# Patient Record
Sex: FEMALE | URBAN - METROPOLITAN AREA
[De-identification: names, ages, dates, MRNs, and addresses within clinical notes are randomized per-mention and may not be internally consistent; named-entity substitution may affect disease eponyms.]

---

## 2022-12-14 ENCOUNTER — APPOINTMENT (OUTPATIENT)
Dept: URBAN - METROPOLITAN AREA CLINIC 278 | Age: 57
Setting detail: DERMATOLOGY
End: 2022-12-14

## 2022-12-14 DIAGNOSIS — D485 NEOPLASM OF UNCERTAIN BEHAVIOR OF SKIN: ICD-10-CM

## 2022-12-14 DIAGNOSIS — D22 MELANOCYTIC NEVI: ICD-10-CM

## 2022-12-14 DIAGNOSIS — L91.8 OTHER HYPERTROPHIC DISORDERS OF THE SKIN: ICD-10-CM

## 2022-12-14 DIAGNOSIS — L81.4 OTHER MELANIN HYPERPIGMENTATION: ICD-10-CM

## 2022-12-14 DIAGNOSIS — Z80.8 FAMILY HISTORY OF MALIGNANT NEOPLASM OF OTHER ORGANS OR SYSTEMS: ICD-10-CM

## 2022-12-14 DIAGNOSIS — L73.8 OTHER SPECIFIED FOLLICULAR DISORDERS: ICD-10-CM

## 2022-12-14 DIAGNOSIS — D18.0 HEMANGIOMA: ICD-10-CM

## 2022-12-14 DIAGNOSIS — L82.1 OTHER SEBORRHEIC KERATOSIS: ICD-10-CM

## 2022-12-14 PROBLEM — D22.5 MELANOCYTIC NEVI OF TRUNK: Status: ACTIVE | Noted: 2022-12-14

## 2022-12-14 PROBLEM — D18.01 HEMANGIOMA OF SKIN AND SUBCUTANEOUS TISSUE: Status: ACTIVE | Noted: 2022-12-14

## 2022-12-14 PROBLEM — D48.5 NEOPLASM OF UNCERTAIN BEHAVIOR OF SKIN: Status: ACTIVE | Noted: 2022-12-14

## 2022-12-14 PROBLEM — D22.21 MELANOCYTIC NEVI OF RIGHT EAR AND EXTERNAL AURICULAR CANAL: Status: ACTIVE | Noted: 2022-12-14

## 2022-12-14 PROCEDURE — OTHER REASSURANCE: OTHER

## 2022-12-14 PROCEDURE — 11102 TANGNTL BX SKIN SINGLE LES: CPT

## 2022-12-14 PROCEDURE — OTHER OBSERVATION: OTHER

## 2022-12-14 PROCEDURE — OTHER COUNSELING: OTHER

## 2022-12-14 PROCEDURE — 99202 OFFICE O/P NEW SF 15 MIN: CPT | Mod: 25

## 2022-12-14 PROCEDURE — OTHER MIPS QUALITY: OTHER

## 2022-12-14 PROCEDURE — OTHER ADDITIONAL NOTES: OTHER

## 2022-12-14 PROCEDURE — OTHER BIOPSY BY SHAVE METHOD: OTHER

## 2022-12-14 ASSESSMENT — LOCATION SIMPLE DESCRIPTION DERM
LOCATION SIMPLE: LEFT SCALP
LOCATION SIMPLE: ABDOMEN
LOCATION SIMPLE: RIGHT SHOULDER
LOCATION SIMPLE: LEFT AXILLARY VAULT
LOCATION SIMPLE: LEFT SHOULDER
LOCATION SIMPLE: LEFT FOREHEAD
LOCATION SIMPLE: RIGHT EAR
LOCATION SIMPLE: RIGHT AXILLARY VAULT
LOCATION SIMPLE: NOSE
LOCATION SIMPLE: UPPER BACK

## 2022-12-14 ASSESSMENT — LOCATION ZONE DERM
LOCATION ZONE: NOSE
LOCATION ZONE: FACE
LOCATION ZONE: ARM
LOCATION ZONE: TRUNK
LOCATION ZONE: SCALP
LOCATION ZONE: AXILLAE
LOCATION ZONE: EAR

## 2022-12-14 ASSESSMENT — LOCATION DETAILED DESCRIPTION DERM
LOCATION DETAILED: LEFT INFERIOR MEDIAL FOREHEAD
LOCATION DETAILED: LEFT MEDIAL FRONTAL SCALP
LOCATION DETAILED: RIGHT AXILLARY VAULT
LOCATION DETAILED: RIGHT POSTERIOR SHOULDER
LOCATION DETAILED: LEFT AXILLARY VAULT
LOCATION DETAILED: INFERIOR THORACIC SPINE
LOCATION DETAILED: LEFT LATERAL ABDOMEN
LOCATION DETAILED: SUPERIOR THORACIC SPINE
LOCATION DETAILED: LEFT NASAL DORSUM
LOCATION DETAILED: LEFT POSTERIOR SHOULDER
LOCATION DETAILED: RIGHT ANTIHELIX

## 2022-12-14 NOTE — PROCEDURE: REASSURANCE
Hide Additional Notes?: No
Additional Note: Benign
Detail Level: Generalized
Include Location In Plan?: Yes

## 2022-12-14 NOTE — PROCEDURE: OBSERVATION
Size Of Lesion In Cm (Optional): 0
Detail Level: Simple
Detail Level: Generalized
Detail Level: Detailed
Detail Level: Zone

## 2022-12-14 NOTE — PROCEDURE: BIOPSY BY SHAVE METHOD
Notification Instructions: Patient will be notified of biopsy results. However, patient instructed to call the office if not contacted within 2 weeks.
Render In Bullet Format When Appropriate: No
Curettage Text: The wound bed was treated with curettage after the biopsy was performed.
Electrodesiccation Text: The wound bed was treated with electrodesiccation after the biopsy was performed.
Wound Care: Petrolatum
Anesthesia Type: 1% lidocaine with epinephrine
Electrodesiccation And Curettage Text: The wound bed was treated with electrodesiccation and curettage after the biopsy was performed.
Billing Type: Third-Party Bill
Consent: Written consent was obtained and risks were reviewed including but not limited to scarring, infection, bleeding, scabbing, incomplete removal, nerve damage and allergy to anesthesia.
Detail Level: Detailed
Information: Selecting Yes will display possible errors in your note based on the variables you have selected. This validation is only offered as a suggestion for you. PLEASE NOTE THAT THE VALIDATION TEXT WILL BE REMOVED WHEN YOU FINALIZE YOUR NOTE. IF YOU WANT TO FAX A PRELIMINARY NOTE YOU WILL NEED TO TOGGLE THIS TO 'NO' IF YOU DO NOT WANT IT IN YOUR FAXED NOTE.
X Size Of Lesion In Cm: 0
Biopsy Method: Dermablade
Anesthesia Volume In Cc (Will Not Render If 0): 1
Depth Of Biopsy: dermis
Was A Bandage Applied: Yes
Post-Care Instructions: I reviewed with the patient in detail post-care instructions. Patient is to keep the biopsy site dry overnight, and then apply bacitracin twice daily until healed. Patient may apply hydrogen peroxide soaks to remove any crusting.
Hemostasis: Aluminum Chloride
Cryotherapy Text: The wound bed was treated with cryotherapy after the biopsy was performed.
Biopsy Type: H and E
Type Of Destruction Used: Curettage
Silver Nitrate Text: The wound bed was treated with silver nitrate after the biopsy was performed.
Dressing: bandage

## 2023-09-05 ENCOUNTER — APPOINTMENT (OUTPATIENT)
Dept: URBAN - METROPOLITAN AREA CLINIC 278 | Age: 58
Setting detail: DERMATOLOGY
End: 2023-09-06

## 2023-09-05 DIAGNOSIS — Z71.89 OTHER SPECIFIED COUNSELING: ICD-10-CM

## 2023-09-05 PROBLEM — D23.5 OTHER BENIGN NEOPLASM OF SKIN OF TRUNK: Status: ACTIVE | Noted: 2023-09-05

## 2023-09-05 PROCEDURE — OTHER SUNSCREEN RECOMMENDATIONS: OTHER

## 2023-09-05 PROCEDURE — OTHER COUNSELING: OTHER

## 2023-09-05 PROCEDURE — 99212 OFFICE O/P EST SF 10 MIN: CPT

## 2023-09-05 PROCEDURE — OTHER MIPS QUALITY: OTHER
